# Patient Record
Sex: FEMALE | Race: WHITE | NOT HISPANIC OR LATINO
[De-identification: names, ages, dates, MRNs, and addresses within clinical notes are randomized per-mention and may not be internally consistent; named-entity substitution may affect disease eponyms.]

---

## 2017-05-02 ENCOUNTER — APPOINTMENT (OUTPATIENT)
Dept: OTOLARYNGOLOGY | Facility: CLINIC | Age: 77
End: 2017-05-02

## 2017-05-02 VITALS
WEIGHT: 130 LBS | BODY MASS INDEX: 25.52 KG/M2 | SYSTOLIC BLOOD PRESSURE: 120 MMHG | DIASTOLIC BLOOD PRESSURE: 82 MMHG | HEART RATE: 64 BPM | HEIGHT: 60 IN

## 2017-10-11 ENCOUNTER — TRANSCRIPTION ENCOUNTER (OUTPATIENT)
Age: 77
End: 2017-10-11

## 2017-10-27 ENCOUNTER — APPOINTMENT (OUTPATIENT)
Dept: OTOLARYNGOLOGY | Facility: CLINIC | Age: 77
End: 2017-10-27
Payer: MEDICARE

## 2017-10-27 VITALS
WEIGHT: 129 LBS | SYSTOLIC BLOOD PRESSURE: 177 MMHG | BODY MASS INDEX: 25.32 KG/M2 | HEIGHT: 60 IN | HEART RATE: 66 BPM | DIASTOLIC BLOOD PRESSURE: 92 MMHG

## 2017-10-27 PROCEDURE — 69210 REMOVE IMPACTED EAR WAX UNI: CPT

## 2017-10-27 PROCEDURE — 99213 OFFICE O/P EST LOW 20 MIN: CPT | Mod: 25

## 2017-11-07 ENCOUNTER — APPOINTMENT (OUTPATIENT)
Dept: OTOLARYNGOLOGY | Facility: CLINIC | Age: 77
End: 2017-11-07

## 2018-11-30 ENCOUNTER — APPOINTMENT (OUTPATIENT)
Dept: OTOLARYNGOLOGY | Facility: CLINIC | Age: 78
End: 2018-11-30
Payer: MEDICARE

## 2018-11-30 VITALS
WEIGHT: 125 LBS | DIASTOLIC BLOOD PRESSURE: 80 MMHG | HEART RATE: 63 BPM | BODY MASS INDEX: 24.54 KG/M2 | SYSTOLIC BLOOD PRESSURE: 158 MMHG | HEIGHT: 60 IN

## 2018-11-30 PROCEDURE — 99212 OFFICE O/P EST SF 10 MIN: CPT | Mod: 25

## 2018-11-30 PROCEDURE — 69210 REMOVE IMPACTED EAR WAX UNI: CPT

## 2019-05-10 ENCOUNTER — APPOINTMENT (OUTPATIENT)
Dept: OTOLARYNGOLOGY | Facility: CLINIC | Age: 79
End: 2019-05-10
Payer: MEDICARE

## 2019-05-10 PROCEDURE — 99213 OFFICE O/P EST LOW 20 MIN: CPT | Mod: 25

## 2019-05-10 PROCEDURE — G0268 REMOVAL OF IMPACTED WAX MD: CPT

## 2019-05-10 PROCEDURE — 92557 COMPREHENSIVE HEARING TEST: CPT

## 2019-05-10 PROCEDURE — 92550 TYMPANOMETRY & REFLEX THRESH: CPT

## 2019-05-10 NOTE — HISTORY OF PRESENT ILLNESS
[de-identified] : 78F here for routine ENT f/u.\par \par Last seen 11/2018. She has no new issues and feels well.\par \par She continues to deal with legal/financial issues with her sister in [place] who has dementia; this has given her significant stress.\par Thinks ears have built up some cerumen and would like them cleaned.\par Her hoarseness/raspy voice remains unchanged. She vehemently disagrees with her diagnosis of acid reflux. Otherwise, no other acute complaints.\par \par Audiogram from today is essentially unchanged from prior one in 2016.\par \par ROS otherwise negative.\par

## 2019-05-10 NOTE — CONSULT LETTER
[Dear  ___] : Dear  [unfilled], [Courtesy Letter:] : I had the pleasure of seeing your patient, [unfilled], in my office today. [Consult Closing:] : Thank you very much for allowing me to participate in the care of this patient.  If you have any questions, please do not hesitate to contact me. [Sincerely,] : Sincerely, [DrRenea  ___] : Dr. MURCIA [Yariel Higginbotham MD] : Yariel Higginbotham MD  [Department of Otolaryngology, Head and Neck Surgery] : Department of Otolaryngology, Head and Neck Surgery [[facility]] : [facility]

## 2019-05-10 NOTE — PHYSICAL EXAM
[de-identified] : septum midline, b/l nasal airways widely patent [Midline] : trachea located in midline position [de-identified] : 1-2+ tonsils, slightly hyperemic w scattered pockets of stones/exudate [Normal] : no rashes [Hearing Loss Right Only] : normal [Hearing Loss Left Only] : normal [Rinne Test Air Conduction Persists > Bone Conduction Right] : air conduction greater than bone conduction on the right [Rinne Test Air Conduction Persists > Bone Conduction Left] : air conduction greater than bone conduction on the left [Hearing Do Test (Tuning Fork On Forehead)] : no lateralization of tone [FreeTextEntry1] : Au: external ear wnl, EAC w thick cerumen along canal walls removed w angled curet. TM intact and mobile, ME clear

## 2019-05-10 NOTE — ASSESSMENT
[FreeTextEntry1] : 78F here for routine followup. Last seen 7 months ago. She has no new otolaryngologic complaints today and feels well. Audiogram today is unchanged from prior testing 11/2016. On exam, both ears were occluded with cerumen and were cleaned with good relief. Otherwise, the complete and comprehensive head and neck exam is unremarkable. She continues to do well. RTO 12 months, or sooner,  should anything develop in the interim. \par \par

## 2019-09-13 ENCOUNTER — APPOINTMENT (OUTPATIENT)
Dept: OTOLARYNGOLOGY | Facility: CLINIC | Age: 79
End: 2019-09-13
Payer: MEDICARE

## 2019-09-13 VITALS
DIASTOLIC BLOOD PRESSURE: 67 MMHG | WEIGHT: 122 LBS | HEART RATE: 58 BPM | BODY MASS INDEX: 23.95 KG/M2 | HEIGHT: 60 IN | SYSTOLIC BLOOD PRESSURE: 121 MMHG

## 2019-09-13 PROCEDURE — 69210 REMOVE IMPACTED EAR WAX UNI: CPT

## 2019-09-13 PROCEDURE — 99213 OFFICE O/P EST LOW 20 MIN: CPT | Mod: 25

## 2019-09-13 NOTE — ASSESSMENT
[FreeTextEntry1] : 79F here for routine followup. Since last seen 5/10/2010 she has no new issues and feels well. She thinks ears have built up some cerumen and would like them cleaned. Her hoarseness/raspy voice remains unchanged. On exam, both ears were occluded with cerumen and were cleaned with good relief. Otherwise, the complete and comprehensive head and neck exam is unremarkable. She continues to do well. RTO 4-6 months, or sooner,  should anything develop in the interim. \par \par

## 2019-09-13 NOTE — CONSULT LETTER
[Dear  ___] : Dear  [unfilled], [Courtesy Letter:] : I had the pleasure of seeing your patient, [unfilled], in my office today. [Consult Closing:] : Thank you very much for allowing me to participate in the care of this patient.  If you have any questions, please do not hesitate to contact me. [Sincerely,] : Sincerely, [Yariel Higginbotham MD] : Yariel Higginbotham MD  [DrRenea  ___] : Dr. MURCIA [[facility]] : [facility] [Department of Otolaryngology, Head and Neck Surgery] : Department of Otolaryngology, Head and Neck Surgery

## 2019-09-13 NOTE — PHYSICAL EXAM
[de-identified] : septum midline, b/l nasal airways widely patent [de-identified] : 1-2+ tonsils, slightly hyperemic w scattered pockets of stones/exudate [Midline] : trachea located in midline position [Normal] : no rashes [Hearing Loss Right Only] : normal [Hearing Loss Left Only] : normal [Rinne Test Air Conduction Persists > Bone Conduction Left] : air conduction greater than bone conduction on the left [Rinne Test Air Conduction Persists > Bone Conduction Right] : air conduction greater than bone conduction on the right [FreeTextEntry1] : Au: external ear wnl, EAC w thick cerumen along canal walls removed w angled curet. TM intact and mobile, ME clear [Hearing Do Test (Tuning Fork On Forehead)] : no lateralization of tone

## 2019-09-13 NOTE — HISTORY OF PRESENT ILLNESS
[de-identified] : 79F here for routine ENT f/u.\par \par Last seen 5/10/2010. She has no new issues and feels well.\par \par Thinks ears have built up some cerumen and would like them cleaned.\par Her hoarseness/raspy voice remains unchanged. She vehemently disagrees with her diagnosis of acid reflux. Otherwise, no other acute complaints.\par Much less stress in her life.\par \par ROS otherwise negative.\par

## 2020-01-30 ENCOUNTER — APPOINTMENT (OUTPATIENT)
Dept: OTOLARYNGOLOGY | Facility: CLINIC | Age: 80
End: 2020-01-30
Payer: MEDICARE

## 2020-01-30 VITALS
WEIGHT: 123 LBS | HEIGHT: 60 IN | BODY MASS INDEX: 24.15 KG/M2 | HEART RATE: 68 BPM | DIASTOLIC BLOOD PRESSURE: 105 MMHG | SYSTOLIC BLOOD PRESSURE: 164 MMHG

## 2020-01-30 PROCEDURE — 69210 REMOVE IMPACTED EAR WAX UNI: CPT

## 2020-01-30 PROCEDURE — 99213 OFFICE O/P EST LOW 20 MIN: CPT | Mod: 25

## 2020-01-30 NOTE — CONSULT LETTER
[Dear  ___] : Dear  [unfilled], [Courtesy Letter:] : I had the pleasure of seeing your patient, [unfilled], in my office today. [Consult Closing:] : Thank you very much for allowing me to participate in the care of this patient.  If you have any questions, please do not hesitate to contact me. [DrRenea  ___] : Dr. MURCIA [Sincerely,] : Sincerely, [Yariel Higginbotham MD] : Yariel Higginbotham MD  [Department of Otolaryngology, Head and Neck Surgery] : Department of Otolaryngology, Head and Neck Surgery [[facility]] : [facility]

## 2020-01-30 NOTE — HISTORY OF PRESENT ILLNESS
[de-identified] : 79F here for routine ENT f/u.\par \par Last seen 9/13/19. She has no new issues and feels well. \par \par Thinks ears have built up some cerumen and would like them cleaned.\par Her hoarseness/raspy voice remains unchanged. She vehemently disagrees with her diagnosis of acid reflux. Otherwise, no other acute complaints.\par Significant amount of stress in her life.\par \par ROS otherwise negative.

## 2020-01-30 NOTE — PHYSICAL EXAM
[de-identified] : septum midline, b/l nasal airways widely patent [Midline] : trachea located in midline position [de-identified] : 1-2+ tonsils, slightly hyperemic w scattered pockets of stones/exudate [Normal] : orientation to person, place, and time: normal [Hearing Loss Right Only] : normal [Rinne Test Air Conduction Persists > Bone Conduction Left] : air conduction greater than bone conduction on the left [Rinne Test Air Conduction Persists > Bone Conduction Right] : air conduction greater than bone conduction on the right [Hearing Loss Left Only] : normal [FreeTextEntry1] : Au: external ear wnl, EAC w thick cerumen along canal walls removed w angled curet and suction. TM intact and mobile, ME clear [Hearing Do Test (Tuning Fork On Forehead)] : no lateralization of tone

## 2020-01-30 NOTE — ASSESSMENT
[FreeTextEntry1] : 79F here for routine followup. Since last seen 4 months prior she has no new issues and feels well. she is under lots of personal stress. She thinks ears have built up some cerumen and would like them cleaned. Her hoarseness/raspy voice remains unchanged. On exam, both ears were filled with cerumen and were cleaned with good relief. Otherwise, the complete and comprehensive head and neck exam is unremarkable. She continues to do well. RTO 4-6 months, or sooner,  should anything develop in the interim. \par \par

## 2020-06-23 ENCOUNTER — APPOINTMENT (OUTPATIENT)
Dept: OTOLARYNGOLOGY | Facility: CLINIC | Age: 80
End: 2020-06-23
Payer: MEDICARE

## 2020-06-23 VITALS
TEMPERATURE: 96.3 F | WEIGHT: 125 LBS | HEIGHT: 60 IN | SYSTOLIC BLOOD PRESSURE: 148 MMHG | HEART RATE: 74 BPM | DIASTOLIC BLOOD PRESSURE: 83 MMHG | BODY MASS INDEX: 24.54 KG/M2

## 2020-06-23 PROCEDURE — 99212 OFFICE O/P EST SF 10 MIN: CPT | Mod: 25

## 2020-06-23 PROCEDURE — 69210 REMOVE IMPACTED EAR WAX UNI: CPT

## 2020-06-23 NOTE — ASSESSMENT
[FreeTextEntry1] : 80F here for routine followup. Since last seen 4 months prior she has no new issues and feels well. she is under lots of personal stress. She thinks ears have built up some cerumen and would like them cleaned. Her hoarseness/raspy voice remains unchanged. On exam, both ears were filled with cerumen and were cleaned with good relief. Otherwise, the complete and comprehensive head and neck exam is unremarkable. She continues to do well. RTO 4-6 months, or sooner,  should anything develop in the interim. \par \par

## 2020-06-23 NOTE — HISTORY OF PRESENT ILLNESS
[de-identified] : 80F here for routine ENT f/u.\par \par Last seen 1/2020. She has no new issues and feels well. \par \par Thinks ears have built up some cerumen and would like them cleaned.\par Her hoarseness/raspy voice remains unchanged. She vehemently disagrees with her diagnosis of acid reflux. Otherwise, no other acute complaints.\par Significant amount of stress in her life.\par \par ROS otherwise negative.

## 2020-06-23 NOTE — PHYSICAL EXAM
[de-identified] : septum midline, b/l nasal airways widely patent [Midline] : trachea located in midline position [de-identified] : 1-2+ tonsils, slightly hyperemic w scattered pockets of stones/exudate [Normal] : no rashes [Hearing Loss Right Only] : normal [Hearing Loss Left Only] : normal [Hearing Do Test (Tuning Fork On Forehead)] : no lateralization of tone [Rinne Test Air Conduction Persists > Bone Conduction Left] : air conduction greater than bone conduction on the left [Rinne Test Air Conduction Persists > Bone Conduction Right] : air conduction greater than bone conduction on the right [FreeTextEntry1] : Au: external ear wnl, EAC w thick cerumen along canal walls removed w angled curet and suction. TM intact and mobile, ME clear

## 2020-06-23 NOTE — CONSULT LETTER
[Dear  ___] : Dear  [unfilled], [Courtesy Letter:] : I had the pleasure of seeing your patient, [unfilled], in my office today. [Sincerely,] : Sincerely, [Consult Closing:] : Thank you very much for allowing me to participate in the care of this patient.  If you have any questions, please do not hesitate to contact me. [DrRenea  ___] : Dr. MURCIA [Yariel Higginbotham MD] : Yariel Higginbotham MD  [[facility]] : [facility] [Department of Otolaryngology, Head and Neck Surgery] : Department of Otolaryngology, Head and Neck Surgery

## 2020-10-22 ENCOUNTER — APPOINTMENT (OUTPATIENT)
Dept: OTOLARYNGOLOGY | Facility: CLINIC | Age: 80
End: 2020-10-22
Payer: MEDICARE

## 2020-10-22 PROCEDURE — 69210 REMOVE IMPACTED EAR WAX UNI: CPT

## 2020-10-22 PROCEDURE — 99213 OFFICE O/P EST LOW 20 MIN: CPT | Mod: 25

## 2020-10-22 NOTE — HISTORY OF PRESENT ILLNESS
[de-identified] : 80F here for routine ENT f/u.\par \par Last seen 6/2020. She feels well. \par \par Thinks ears have built up some cerumen and would like them cleaned.\par Her hoarseness/raspy voice remains unchanged. She vehemently disagrees with her diagnosis of acid reflux. \par For the past 4 months or so she feels some pain/discomfort in her neck on the left side - this comes and goes and is exacerbated by neck movement, relieved w occasional NSAIDs. It started after sitting upright for a long time while out w friends and she thought she strained her neck.\par No numbness/tingling, no weakness.\par \par ROS otherwise negative.

## 2020-10-22 NOTE — CONSULT LETTER
[Dear  ___] : Dear  [unfilled], [Courtesy Letter:] : I had the pleasure of seeing your patient, [unfilled], in my office today. [Consult Closing:] : Thank you very much for allowing me to participate in the care of this patient.  If you have any questions, please do not hesitate to contact me. [Sincerely,] : Sincerely, [DrRenea  ___] : Dr. MURCIA [Yariel Higginbotham MD] : Yariel Higginbotham MD  [Department of Otolaryngology, Head and Neck Surgery] : Department of Otolaryngology, Head and Neck Surgery [[facility]] : [facility]

## 2020-10-22 NOTE — ASSESSMENT
[FreeTextEntry1] : 80F here for routine followup. Since last seen 4 months prior she feels well. She thinks ears have built up some cerumen and would like them cleaned. Her hoarseness/raspy voice remains unchanged. For the past 4 months or so also she feels some pain/discomfort in her neck on the left side - this comes and goes and is exacerbated by neck movement, relieved w occasional NSAIDs. It started after sitting upright for a long time while out w friends and she thought she strained her neck. On exam, both ears were filled with cerumen and were cleaned with good relief. There is discomfort on deep palpation over the left trapezius area with no discrete masses/lesions. Otherwise, the complete and comprehensive head and neck exam is unremarkable. Left neck pain likely musculoskeletal - PT, exercise and NSAIDs as needed. RTO 4-6 months, or sooner,  should anything develop in the interim. \par \par

## 2021-01-19 ENCOUNTER — APPOINTMENT (OUTPATIENT)
Dept: OTOLARYNGOLOGY | Facility: CLINIC | Age: 81
End: 2021-01-19
Payer: MEDICARE

## 2021-01-19 VITALS
SYSTOLIC BLOOD PRESSURE: 131 MMHG | HEART RATE: 72 BPM | DIASTOLIC BLOOD PRESSURE: 83 MMHG | BODY MASS INDEX: 24.54 KG/M2 | WEIGHT: 125 LBS | HEIGHT: 60 IN | TEMPERATURE: 96.5 F

## 2021-01-19 PROCEDURE — 99213 OFFICE O/P EST LOW 20 MIN: CPT | Mod: 25

## 2021-01-19 PROCEDURE — 69210 REMOVE IMPACTED EAR WAX UNI: CPT

## 2021-01-19 NOTE — HISTORY OF PRESENT ILLNESS
[de-identified] : 80F here for routine ENT f/u.\par \par Last seen 10/2020. She feels well. \par \par Thinks ears have built up some cerumen and would like them cleaned.\par Her hoarseness/raspy voice remains unchanged. She vehemently disagrees with her diagnosis of acid reflux. \par She still feels some pain/discomfort in her neck on the left side - this comes and goes and is exacerbated by neck movement, relieved w occasional NSAIDs. It started after sitting upright for a long time while out w friends and she thought she strained her neck. She is getting PT and it is improving.\par No numbness/tingling, no weakness.\par \par ROS otherwise negative.

## 2021-01-19 NOTE — CONSULT LETTER
[Dear  ___] : Dear  [unfilled], [Courtesy Letter:] : I had the pleasure of seeing your patient, [unfilled], in my office today. [Consult Closing:] : Thank you very much for allowing me to participate in the care of this patient.  If you have any questions, please do not hesitate to contact me. [Sincerely,] : Sincerely, [DrRenea  ___] : Dr. MURCIA [Yariel Higginbotham MD] : Yariel Higginbotham MD  [Department of Otolaryngology, Head and Neck Surgery] : Department of Otolaryngology, Head and Neck Surgery [[facility]] : [facility]

## 2021-01-19 NOTE — ASSESSMENT
[FreeTextEntry1] : 80F here for routine followup. Since last seen 4 months prior she feels well. She thinks ears have built up some cerumen and would like them cleaned. Her hoarseness/raspy voice remains unchanged. There is also still pain/discomfort in her neck on the left side - this comes and goes and is exacerbated by neck movement, relieved w occasional NSAIDs. It started after sitting upright for a long time while out w friends and she thought she strained her neck. She is getting PT and it is improved. On exam, both ears were filled with cerumen and were cleaned with good relief. There is no neck mass or lesion and the complete and comprehensive head and neck exam is unremarkable. Left neck pain likely musculoskeletal and is improving - continue with PT, exercise and NSAIDs as needed. RTO 4-6 months, or sooner,  should anything develop in the interim. \par \par

## 2021-01-19 NOTE — PHYSICAL EXAM
[de-identified] : no masses/lesions; some pain on deep palpation over left neck muscles (trapezius region) [de-identified] : septum midline, b/l nasal airways widely patent [Midline] : trachea located in midline position [de-identified] : 1-2+ tonsils, slightly hyperemic w scattered pockets of stones/exudate [Normal] : no rashes [Hearing Loss Right Only] : normal [Hearing Loss Left Only] : normal [Rinne Test Air Conduction Persists > Bone Conduction Right] : air conduction greater than bone conduction on the right [Rinne Test Air Conduction Persists > Bone Conduction Left] : air conduction greater than bone conduction on the left [Hearing Do Test (Tuning Fork On Forehead)] : no lateralization of tone [FreeTextEntry1] : Au: external ear wnl, EAC w mild thick cerumen along canal walls removed w angled curet. TM intact and mobile, ME clear

## 2021-05-04 ENCOUNTER — APPOINTMENT (OUTPATIENT)
Dept: OTOLARYNGOLOGY | Facility: CLINIC | Age: 81
End: 2021-05-04
Payer: MEDICARE

## 2021-05-04 PROCEDURE — 69210 REMOVE IMPACTED EAR WAX UNI: CPT

## 2021-05-04 PROCEDURE — 99213 OFFICE O/P EST LOW 20 MIN: CPT | Mod: 25

## 2021-05-04 NOTE — ASSESSMENT
[FreeTextEntry1] : 80F here for routine followup. Since last seen 4 months prior she feels well. She thinks ears have built up some cerumen and would like them cleaned. Her hoarseness/raspy voice remains unchanged. There is no further neck discomfort. On exam, both ears were filled with cerumen and were cleaned with good relief. There is no neck mass or lesion and the complete and comprehensive head and neck exam is unremarkable.\par She is doing well. RTO 4-6 months, or sooner,  should anything develop in the interim. \par \par

## 2021-05-04 NOTE — CONSULT LETTER
[Dear  ___] : Dear  [unfilled], [Courtesy Letter:] : I had the pleasure of seeing your patient, [unfilled], in my office today. [Consult Closing:] : Thank you very much for allowing me to participate in the care of this patient.  If you have any questions, please do not hesitate to contact me. [Sincerely,] : Sincerely, [DrRenea  ___] : Dr. MURCIA [Yariel Higginbotham MD] : Yariel Higginbotham MD  [Department of Otolaryngology, Head and Neck Surgery] : Department of Otolaryngology, Head and Neck Surgery [[facility]] : [facility]

## 2021-05-04 NOTE — HISTORY OF PRESENT ILLNESS
[de-identified] : 80F here for routine ENT f/u.\par \par Last seen 1/2021. She feels well. \par \par Thinks ears have built up some cerumen and would like them cleaned.\par Her hoarseness/raspy voice remains unchanged. Her left neck pain has improved.\par \par ROS otherwise negative.

## 2021-05-04 NOTE — PHYSICAL EXAM
[de-identified] : no masses/lesions [de-identified] : septum midline, b/l nasal airways widely patent [Midline] : trachea located in midline position [de-identified] : 1-2+ tonsils, slightly hyperemic w scattered pockets of stones/exudate [Normal] : no rashes [Hearing Loss Right Only] : normal [Hearing Loss Left Only] : normal [Rinne Test Air Conduction Persists > Bone Conduction Right] : air conduction greater than bone conduction on the right [Rinne Test Air Conduction Persists > Bone Conduction Left] : air conduction greater than bone conduction on the left [Hearing Do Test (Tuning Fork On Forehead)] : no lateralization of tone [FreeTextEntry1] : Au: external ear wnl, EAC w moderate thick cerumen along canal walls removed w angled curet. TM intact and mobile, ME clear

## 2021-09-17 ENCOUNTER — APPOINTMENT (OUTPATIENT)
Dept: OTOLARYNGOLOGY | Facility: CLINIC | Age: 81
End: 2021-09-17
Payer: MEDICARE

## 2021-09-17 DIAGNOSIS — K21.9 GASTRO-ESOPHAGEAL REFLUX DISEASE W/OUT ESOPHAGITIS: ICD-10-CM

## 2021-09-17 DIAGNOSIS — H61.20 IMPACTED CERUMEN, UNSPECIFIED EAR: ICD-10-CM

## 2021-09-17 DIAGNOSIS — R49.0 DYSPHONIA: ICD-10-CM

## 2021-09-17 PROCEDURE — 31575 DIAGNOSTIC LARYNGOSCOPY: CPT

## 2021-09-17 PROCEDURE — 99214 OFFICE O/P EST MOD 30 MIN: CPT | Mod: 25

## 2021-09-17 PROCEDURE — 69210 REMOVE IMPACTED EAR WAX UNI: CPT

## 2021-09-17 NOTE — PROCEDURE
[FreeTextEntry3] : Nasal Endoscopy:\par nasal airways patent\par s/p antrostomies; middle meati patent. +recirculation mucus right OMC suctioned; nonobstructive synechiae by left OMC; no mucopus or polyps\par choana clear\par \par Fiberoptic Laryngoscopy:\par upper airway widely patent\par long uvula touching tongue base\par TVF symmetric and mobile\par no masses/lesions

## 2021-09-17 NOTE — HISTORY OF PRESENT ILLNESS
[de-identified] : 81F here for routine ENT f/u.\par \par Last seen 5/2021. \par Over the past 3 months or so, she c/o constellation of throat sx including hoarseness with feeling of phlegm she cannot cough up or swallow with persistent coughing/throat clearing. Initially there was feeling something stuck in her throat but that has resolved. There is also generalized weakness and 8lb weight loss. Above sx triggered by deep breaths or laughing.\par She saw PCP whereupon she had right lung base rales but CXR was unremarkable and was given PPIs for presumed reflux.\par She also thinks ears have built up some cerumen and would like them cleaned.\par \par ROS otherwise negative.

## 2021-09-17 NOTE — ASSESSMENT
[FreeTextEntry1] : 81F here in followup. Over the past 3 months or so, she c/o constellation of throat sx including hoarseness with feeling of phlegm she cannot cough up or swallow with persistent coughing/throat clearing. Initially there was feeling of something stuck in her throat but that has resolved. There is also generalized weakness and 8lb weight loss. Above sx are triggered by deep breaths or laughing. She saw PCP whereupon she had right lung base rales but CXR was unremarkable and was given PPIs for presumed reflux. She also thinks ears have built up some cerumen and would like them cleaned.\par On exam, cerumen was cleaned from both ears with relief. Nasal endoscopy shows patent prior antrostomies with patent middle meati patent; mild amount recirculation mucus from the right OMC suctioned. There are nonobstructive synechiae by left OMC, but both middle meati are patent w no mucopus or polyps. The choana is clear. The rest of the complete and comprehensive head and neck exam, including fiberoptic laryngoscopy, is unremarkable.\par I agree that sx most likely due to uncontrolled laryngopharyngeal reflux; there is no discernible postnasal drip on endoscopy today. Will continue PPI treatment w strict diet/lifestyle changes for 6 weeks and RTO thereafter.\par \par \par

## 2021-09-17 NOTE — CONSULT LETTER
[Dear  ___] : Dear  [unfilled], [Courtesy Letter:] : I had the pleasure of seeing your patient, [unfilled], in my office today. [Consult Closing:] : Thank you very much for allowing me to participate in the care of this patient.  If you have any questions, please do not hesitate to contact me. [Sincerely,] : Sincerely, [DrRenea  ___] : Dr. MURCIA [Yariel Higginbotham MD] : Yariel Higginbotham MD  [Department of Otolaryngology, Head and Neck Surgery] : Department of Otolaryngology, Head and Neck Surgery [[facility]] : [facility]

## 2021-09-17 NOTE — PHYSICAL EXAM
[de-identified] : no masses/lesions [Nasal Endoscopy Performed] : nasal endoscopy was performed, see procedure section for findings [Midline] : trachea located in midline position [Laryngoscopy Performed] : laryngoscopy was performed, see procedure section for findings [de-identified] : poaterior opx clear, long uvula [de-identified] : 1-2+ tonsils [Normal] : no rashes [Hearing Loss Right Only] : normal [Hearing Loss Left Only] : normal [Rinne Test Air Conduction Persists > Bone Conduction Right] : air conduction greater than bone conduction on the right [Rinne Test Air Conduction Persists > Bone Conduction Left] : air conduction greater than bone conduction on the left [Hearing Do Test (Tuning Fork On Forehead)] : no lateralization of tone [FreeTextEntry1] : Au: external ear wnl, EAC w moderate thick cerumen along canal walls removed w angled curet. TM intact and mobile, ME clear

## 2021-10-26 ENCOUNTER — APPOINTMENT (OUTPATIENT)
Dept: OTOLARYNGOLOGY | Facility: CLINIC | Age: 81
End: 2021-10-26

## 2023-05-10 ENCOUNTER — TRANSCRIPTION ENCOUNTER (OUTPATIENT)
Age: 83
End: 2023-05-10

## 2023-05-10 ENCOUNTER — APPOINTMENT (OUTPATIENT)
Dept: OTOLARYNGOLOGY | Facility: CLINIC | Age: 83
End: 2023-05-10

## 2023-05-10 ENCOUNTER — APPOINTMENT (OUTPATIENT)
Dept: OTOLARYNGOLOGY | Facility: CLINIC | Age: 83
End: 2023-05-10
Payer: MEDICARE

## 2023-05-10 VITALS
SYSTOLIC BLOOD PRESSURE: 144 MMHG | OXYGEN SATURATION: 100 % | HEIGHT: 63 IN | BODY MASS INDEX: 28.35 KG/M2 | HEART RATE: 75 BPM | TEMPERATURE: 97.6 F | DIASTOLIC BLOOD PRESSURE: 84 MMHG | WEIGHT: 160 LBS

## 2023-05-10 DIAGNOSIS — K21.9 ACUTE LARYNGITIS: ICD-10-CM

## 2023-05-10 DIAGNOSIS — R42 DIZZINESS AND GIDDINESS: ICD-10-CM

## 2023-05-10 DIAGNOSIS — J38.7 OTHER DISEASES OF LARYNX: ICD-10-CM

## 2023-05-10 DIAGNOSIS — H93.13 TINNITUS, BILATERAL: ICD-10-CM

## 2023-05-10 DIAGNOSIS — J04.0 ACUTE LARYNGITIS: ICD-10-CM

## 2023-05-10 DIAGNOSIS — H61.23 IMPACTED CERUMEN, BILATERAL: ICD-10-CM

## 2023-05-10 DIAGNOSIS — R49.8 OTHER VOICE AND RESONANCE DISORDERS: ICD-10-CM

## 2023-05-10 PROCEDURE — G0268 REMOVAL OF IMPACTED WAX MD: CPT

## 2023-05-10 PROCEDURE — 92550 TYMPANOMETRY & REFLEX THRESH: CPT | Mod: 52

## 2023-05-10 PROCEDURE — 99214 OFFICE O/P EST MOD 30 MIN: CPT | Mod: 25

## 2023-05-10 PROCEDURE — 31575 DIAGNOSTIC LARYNGOSCOPY: CPT

## 2023-05-10 PROCEDURE — 92557 COMPREHENSIVE HEARING TEST: CPT

## 2023-05-10 NOTE — PROCEDURE
[Cerumen Impaction] : Cerumen Impaction [Same] : same as the Pre Op Dx. 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

## 2023-05-10 NOTE — PHYSICAL EXAM
[Midline] : trachea located in midline position [Normal] : no nystagmus [Laryngoscopy Performed] : laryngoscopy was performed, see procedure section for findings [de-identified] : b copious cerumen removed atraumatically with suction l>r, b TMs nl  [] : Pimento-Hallpike test is negative [de-identified] : gait steady

## 2023-05-10 NOTE — HISTORY OF PRESENT ILLNESS
[de-identified] : 83 y/o F is presenting with vertigo for the past 3 weeks. She did not see any other medical practice for this. She has been dizzy in the past but never like this. She reports the room was spinning. She denies recent uri/ flu/COVID-19 infxn. No inciting factors. She has longstanding history of tinnitus but reports it got louder after episode of dizziness and sounds like someone is "screaming." .She has seen Dr. Higginbotham in the past for cerumen impaction and reflux. She has h/o ovarian cancer stage 4 and is a patient at [facility] and they believe her cancer caused her throat issues. She has also been nauseated. She has spoken to her oncologist about this and she is not on any antinausea meds. She takes otilia with improvement. She reports her voice is hoarse and is worse over the past week. She was told she has a mass in her chest that may be affecting her voice. She denies cough. Nonsmoker. No FH or SH pertinent to cc.

## 2023-05-10 NOTE — ASSESSMENT
[FreeTextEntry1] : 1. 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 vertigo - vng and mri recommended\par when I advised pt of this she reminded me that she is a stage 4 cancer patient and asked me iif I was concerned that she had new-onset tinnitus and vertigo and wanted to know if I agreed that these tests be done immediately as she stated that a physician missed her ovarian ca dx\par I therefore recommended getting mri today and recommended she go to e.r - I asked if she would like us to call ambulance but she said she could walk there and that she would go to [facility] [place] urgent care. I recommended ER; she said their urgent car is their e.r. . I asked her to please follow up with us in the future if she is ok so we can do inner ear eval and to let us know how she is doing - she said she would